# Patient Record
Sex: MALE | Race: WHITE
[De-identification: names, ages, dates, MRNs, and addresses within clinical notes are randomized per-mention and may not be internally consistent; named-entity substitution may affect disease eponyms.]

---

## 2020-02-28 PROBLEM — Z00.00 ENCOUNTER FOR PREVENTIVE HEALTH EXAMINATION: Status: ACTIVE | Noted: 2020-02-28

## 2020-03-04 ENCOUNTER — APPOINTMENT (OUTPATIENT)
Dept: BARIATRICS | Facility: CLINIC | Age: 40
End: 2020-03-04
Payer: MEDICAID

## 2020-03-04 VITALS
TEMPERATURE: 97 F | DIASTOLIC BLOOD PRESSURE: 101 MMHG | SYSTOLIC BLOOD PRESSURE: 155 MMHG | BODY MASS INDEX: 39.4 KG/M2 | OXYGEN SATURATION: 99 % | WEIGHT: 307 LBS | HEIGHT: 74 IN | HEART RATE: 99 BPM

## 2020-03-04 DIAGNOSIS — M54.5 LOW BACK PAIN: ICD-10-CM

## 2020-03-04 DIAGNOSIS — E88.81 METABOLIC SYNDROME: ICD-10-CM

## 2020-03-04 DIAGNOSIS — I10 ESSENTIAL (PRIMARY) HYPERTENSION: ICD-10-CM

## 2020-03-04 DIAGNOSIS — R12 HEARTBURN: ICD-10-CM

## 2020-03-04 DIAGNOSIS — E66.01 MORBID (SEVERE) OBESITY DUE TO EXCESS CALORIES: ICD-10-CM

## 2020-03-04 DIAGNOSIS — R06.02 SHORTNESS OF BREATH: ICD-10-CM

## 2020-03-04 PROCEDURE — 99203 OFFICE O/P NEW LOW 30 MIN: CPT

## 2020-03-04 NOTE — PHYSICAL EXAM
[Obese, well nourished, in no acute distress] : obese, well nourished, in no acute distress [Normal] : affect appropriate [de-identified] : centarl obesity

## 2020-03-04 NOTE — HISTORY OF PRESENT ILLNESS
[de-identified] : 39 year old male who comes with his fther with bmi of 40 hypertension probable sleep apnea and insulin resistance for potential bariatric surgery\par states that weight is inhibiting all aspects of life and unable to lose weight\par discussed options\par for potential lap vsg after dietary and behavioral education

## 2020-04-22 ENCOUNTER — APPOINTMENT (OUTPATIENT)
Dept: BARIATRICS | Facility: CLINIC | Age: 40
End: 2020-04-22